# Patient Record
Sex: MALE | Race: WHITE
[De-identification: names, ages, dates, MRNs, and addresses within clinical notes are randomized per-mention and may not be internally consistent; named-entity substitution may affect disease eponyms.]

---

## 2018-05-31 ENCOUNTER — HOSPITAL ENCOUNTER (OUTPATIENT)
Dept: HOSPITAL 95 - MHTC | Age: 83
LOS: 1 days | Discharge: HOME | End: 2018-06-01
Attending: INTERNAL MEDICINE
Payer: MEDICARE

## 2018-05-31 VITALS — BODY MASS INDEX: 30.8 KG/M2 | WEIGHT: 220.02 LBS | HEIGHT: 70.98 IN

## 2018-05-31 DIAGNOSIS — E78.00: ICD-10-CM

## 2018-05-31 DIAGNOSIS — I35.0: ICD-10-CM

## 2018-05-31 DIAGNOSIS — I10: ICD-10-CM

## 2018-05-31 DIAGNOSIS — Z87.891: ICD-10-CM

## 2018-05-31 DIAGNOSIS — E66.9: ICD-10-CM

## 2018-05-31 DIAGNOSIS — I25.810: Primary | ICD-10-CM

## 2018-05-31 DIAGNOSIS — I25.82: ICD-10-CM

## 2018-05-31 DIAGNOSIS — E78.5: ICD-10-CM

## 2018-05-31 PROCEDURE — B241ZZ3 ULTRASONOGRAPHY OF MULTIPLE CORONARY ARTERIES, INTRAVASCULAR: ICD-10-PCS | Performed by: INTERNAL MEDICINE

## 2018-05-31 PROCEDURE — B213YZZ FLUOROSCOPY OF MULTIPLE CORONARY ARTERY BYPASS GRAFTS USING OTHER CONTRAST: ICD-10-PCS | Performed by: INTERNAL MEDICINE

## 2018-05-31 PROCEDURE — B218YZZ FLUOROSCOPY OF LEFT INTERNAL MAMMARY BYPASS GRAFT USING OTHER CONTRAST: ICD-10-PCS | Performed by: INTERNAL MEDICINE

## 2018-05-31 PROCEDURE — 4A023N7 MEASUREMENT OF CARDIAC SAMPLING AND PRESSURE, LEFT HEART, PERCUTANEOUS APPROACH: ICD-10-PCS | Performed by: INTERNAL MEDICINE

## 2018-05-31 PROCEDURE — 027034Z DILATION OF CORONARY ARTERY, ONE ARTERY WITH DRUG-ELUTING INTRALUMINAL DEVICE, PERCUTANEOUS APPROACH: ICD-10-PCS | Performed by: INTERNAL MEDICINE

## 2018-05-31 PROCEDURE — C9604 PERC D-E COR REVASC T CABG S: HCPCS

## 2018-05-31 PROCEDURE — C1769 GUIDE WIRE: HCPCS

## 2018-05-31 PROCEDURE — C1874 STENT, COATED/COV W/DEL SYS: HCPCS

## 2018-05-31 PROCEDURE — C1753 CATH, INTRAVAS ULTRASOUND: HCPCS

## 2018-05-31 PROCEDURE — C1760 CLOSURE DEV, VASC: HCPCS

## 2018-05-31 PROCEDURE — C1884 EMBOLIZATION PROTECT SYST: HCPCS

## 2018-05-31 PROCEDURE — B211YZZ FLUOROSCOPY OF MULTIPLE CORONARY ARTERIES USING OTHER CONTRAST: ICD-10-PCS | Performed by: INTERNAL MEDICINE

## 2018-05-31 PROCEDURE — C1725 CATH, TRANSLUMIN NON-LASER: HCPCS

## 2018-06-01 LAB
ANION GAP SERPL CALCULATED.4IONS-SCNC: 5 MMOL/L (ref 6–16)
BASOPHILS # BLD AUTO: 0.03 K/MM3 (ref 0–0.23)
BASOPHILS NFR BLD AUTO: 0 % (ref 0–2)
BUN SERPL-MCNC: 22 MG/DL (ref 8–24)
CALCIUM SERPL-MCNC: 8.6 MG/DL (ref 8.5–10.1)
CHLORIDE SERPL-SCNC: 108 MMOL/L (ref 98–108)
CO2 SERPL-SCNC: 29 MMOL/L (ref 21–32)
CREAT SERPL-MCNC: 0.99 MG/DL (ref 0.6–1.2)
DEPRECATED RDW RBC AUTO: 40.6 FL (ref 35.1–46.3)
EOSINOPHIL # BLD AUTO: 0.17 K/MM3 (ref 0–0.68)
EOSINOPHIL NFR BLD AUTO: 2 % (ref 0–6)
ERYTHROCYTE [DISTWIDTH] IN BLOOD BY AUTOMATED COUNT: 13.1 % (ref 11.7–14.2)
GLUCOSE SERPL-MCNC: 94 MG/DL (ref 70–99)
HCT VFR BLD AUTO: 44.3 % (ref 37–53)
HGB BLD-MCNC: 14.5 G/DL (ref 13.5–17.5)
IMM GRANULOCYTES # BLD AUTO: 0.02 K/MM3 (ref 0–0.1)
IMM GRANULOCYTES NFR BLD AUTO: 0 % (ref 0–1)
LYMPHOCYTES # BLD AUTO: 1.93 K/MM3 (ref 0.84–5.2)
LYMPHOCYTES NFR BLD AUTO: 24 % (ref 21–46)
MCHC RBC AUTO-ENTMCNC: 32.7 G/DL (ref 31.5–36.5)
MCV RBC AUTO: 85 FL (ref 80–100)
MONOCYTES # BLD AUTO: 0.72 K/MM3 (ref 0.16–1.47)
MONOCYTES NFR BLD AUTO: 9 % (ref 4–13)
NEUTROPHILS # BLD AUTO: 5.24 K/MM3 (ref 1.96–9.15)
NEUTROPHILS NFR BLD AUTO: 65 % (ref 41–73)
NRBC # BLD AUTO: 0 K/MM3 (ref 0–0.02)
NRBC BLD AUTO-RTO: 0 /100 WBC (ref 0–0.2)
PLATELET # BLD AUTO: 181 K/MM3 (ref 150–400)
POTASSIUM SERPL-SCNC: 4 MMOL/L (ref 3.5–5.5)
SODIUM SERPL-SCNC: 142 MMOL/L (ref 136–145)

## 2018-06-02 ENCOUNTER — HOSPITAL ENCOUNTER (EMERGENCY)
Dept: HOSPITAL 95 - ER | Age: 83
Discharge: HOME | End: 2018-06-02
Payer: MEDICARE

## 2018-06-02 VITALS — WEIGHT: 209.99 LBS | BODY MASS INDEX: 29.4 KG/M2 | HEIGHT: 71 IN

## 2018-06-02 DIAGNOSIS — I10: ICD-10-CM

## 2018-06-02 DIAGNOSIS — Z79.82: ICD-10-CM

## 2018-06-02 DIAGNOSIS — Z79.899: ICD-10-CM

## 2018-06-02 DIAGNOSIS — Z88.8: ICD-10-CM

## 2018-06-02 DIAGNOSIS — R04.0: Primary | ICD-10-CM

## 2018-06-02 DIAGNOSIS — Z91.048: ICD-10-CM

## 2018-06-02 DIAGNOSIS — Z79.01: ICD-10-CM

## 2018-07-13 ENCOUNTER — HOSPITAL ENCOUNTER (EMERGENCY)
Dept: HOSPITAL 95 - ER | Age: 83
Discharge: HOME | End: 2018-07-13
Payer: MEDICARE

## 2018-07-13 VITALS — HEIGHT: 71 IN | BODY MASS INDEX: 30.38 KG/M2 | WEIGHT: 217 LBS

## 2018-07-13 DIAGNOSIS — Z79.84: ICD-10-CM
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
## 2018-07-13 DIAGNOSIS — I10: ICD-10-CM

## 2018-07-13 DIAGNOSIS — Z87.891: ICD-10-CM

## 2018-07-13 DIAGNOSIS — Z79.899: ICD-10-CM

## 2018-07-13 DIAGNOSIS — S51.811A: Primary | ICD-10-CM

## 2018-07-13 DIAGNOSIS — Z88.8: ICD-10-CM

## 2019-09-16 NOTE — NUR
ADMIT TO SURGICAL FLOOR
RECEIVED REPORT FROM ED RNMICAH AT 0730. PT ARRIVED VIA GURNEY AT
0750. PT ABLE TRANSFER TO BED IND. W/OUT DIFFICULTY, VSS, A&O X3, AND DENIED
ANY PAIN OR DISCOMFORT.

## 2019-09-16 NOTE — NUR
SHIFT SUMMARY
CONFUSION AND FORGETFULLNESS AT TIMES. ANSWERS QUESTIONS APPROPRIATELY, KNOWS
SITUATION BUT FORGOT TAKING MORNING MEDICATIONS. DENIES PAIN, TREMORS, OR
FEVER. CRITICAL HIGH LACTIC ACID OF 2.7 AND HIGH TROPONIN 0.0174. MEDICATED
PER ORDERS AND RECEIVED ECHO TODAY. PT WAS UNABLE TO VOID, STRAIGHT CATH
PRODUCED 600ML OF DARK YELLOW URINE. ABLE TO VOID IND AFTERWARDS. BED ALARM ON
WITH CALL LIGHT WITHIN REACH,

## 2019-09-17 NOTE — NUR
ATTEMPTED TO CALL DR. MIRANDA TWICE R/T PATIENT'S ELEVATED BP, UNABLE TO LEAVE
VOICEMAIL DUE TO "MAILBOX NOT BEING SET-UP." WILL TRY TO CONTACT HIM AGAIN.

## 2019-09-17 NOTE — NUR
SUMMARY
PT ABLE TO VOID AFTER BLADDER SCAN TONIGHT. OCC REPEATS SELF, OTHERWISE
ORIENTED AND APPROPRIATE.

## 2019-09-17 NOTE — NUR
SPOUSE AT BEDSIDE. PT SITTING ON EDGE OF BED EATING LUNCH. DENIES ANY
DISCOMFORT OR CONCERNS AT THIS TIME. REPORTS PASSING FLATUS.

## 2019-09-17 NOTE — NUR
SHIFT SUMMARY
PT A&O AND AMBULATES WITH IND./STAND BY; WALKING TO BATHROOM/HALLWAY AND UP
IN CHAIR DURING SHIFT. SPOUSE AT BEDSIDE T/O SHIFT. DENIED PAIN OR DISCOMFORT
TODAY. REPORTS PASSING GAS AND TOLERATING DIET WELL. SBP INCREASED FROM
BASELINE TO BETWEEN 150-175, ATTEMPTED TO NOTIFIED PROVIDER TWICE- UNABLE TO
LEAVE VOICEMAIL R/T MAILBOX NOT BEING SET-UP. PT IS ASYMPTOMATIC AND DENIES
HEADACHE, LIGHTHEADEDNESS, AND DIZZY. WILL CONTINUE TO MONITOR AND ATTEMPT TO
NOTIFY PROVIDER AGAIN.

## 2019-09-18 NOTE — NUR
SHIFT SUMMARY
 
LYING IN SEMI FOWLERS WITH EYES CLOSED.  HAS BEEN AAO X3 THIS SHIFT.  DENIES
CURRENT PAIN, FURTHER NEEDS, OR OTHER WANTS AT THIS TIME.  SAFETY MEASURES IN
PLACE.  WILL GIVE HAND OFF TO ONCOMING SHIFT USING SBAR.

## 2019-09-18 NOTE — NUR
Pt resting comfortably on right side with even, unlabored respirations. Eyes
closed. Breakfast tray left at bedside. WCTM.

## 2019-09-19 NOTE — NUR
PATIENT D/C'D HOME WITH SPOUSE AT THIS TIME;
BOTH STATE UNDERSTANDING OF MEDS, F/U APPT, RETURN TO ER IF SX RE OCCUR.
DENIES N/PAIN/ OR OTHER C/O.

## 2019-09-19 NOTE — NUR
SHIFT SUMMARY
 
LYING IN SEMI FOWLERS WITH EYES CLOSED.  HAS BEEN AAO X3 THIS SHIFT.  DENIES
CURRENT PAIN, FURTHER NEEDS, OR OTHER WANTS AT THIS TIME.  INDEPENDENT IN
ROOM.  SAFETY MEASURES IN PLACE.  WILL GIVE HAND OFF TO ONCOMING SHIFT USING
SBAR.